# Patient Record
Sex: MALE
[De-identification: names, ages, dates, MRNs, and addresses within clinical notes are randomized per-mention and may not be internally consistent; named-entity substitution may affect disease eponyms.]

---

## 2018-06-07 ENCOUNTER — HOSPITAL ENCOUNTER (EMERGENCY)
Dept: HOSPITAL 14 - H.ER | Age: 83
Discharge: HOME | End: 2018-06-07
Payer: MEDICARE

## 2018-06-07 VITALS
DIASTOLIC BLOOD PRESSURE: 90 MMHG | TEMPERATURE: 98.2 F | HEART RATE: 65 BPM | OXYGEN SATURATION: 98 % | RESPIRATION RATE: 16 BRPM | SYSTOLIC BLOOD PRESSURE: 146 MMHG

## 2018-06-07 DIAGNOSIS — E11.9: ICD-10-CM

## 2018-06-07 DIAGNOSIS — M79.606: Primary | ICD-10-CM

## 2018-06-07 NOTE — ED PDOC
Lower Extremity Pain/Injury


Time Seen by Provider: 06/07/18 13:17


Chief Complaint (Nursing): Lower Extremity Problem/Injury


Chief Complaint (Provider): Leg pain


History Per: Patient, EMS


History/Exam Limitations: no limitations


Onset/Duration Of Symptoms: Mins


Current Symptoms Are (Timing): Better


Additional Complaint(s): 





86 year old male presented to the ED via EMS complaining of leg pain.  Patient 

reports he was walking to the doctor's office when he felt pain in both legs.  

Patient then flagged a  down who called EMS and brought him to the ED.

  He denies leg pain at this time along with swelling, chest pain, and SOB.  





PCP: Scott Ovalles





Past Medical History


Reviewed: Historical Data, Nursing Documentation, Vital Signs


Vital Signs: 





 Last Vital Signs











Temp  98.2 F   06/07/18 12:52


 


Pulse  65   06/07/18 12:52


 


Resp  16   06/07/18 12:52


 


BP  146/90   06/07/18 12:52


 


Pulse Ox  98   06/07/18 12:52














- Medical History


PMH: No Chronic Diseases, Diabetes





- Surgical History


Surgical History: No Surg Hx





- Family History


Family History: States: Unknown Family Hx





- Allergies


Allergies/Adverse Reactions: 


 Allergies











Allergy/AdvReac Type Severity Reaction Status Date / Time


 


No Known Allergies Allergy   Verified 06/07/18 12:55














Review of Systems


ROS Statement: Except As Marked, All Systems Reviewed And Found Negative


Cardiovascular: Negative for: Chest Pain


Respiratory: Negative for: Shortness of Breath


Musculoskeletal: Positive for: Leg Pain (bilateral).  Negative for: Other (leg 

swelling)





Physical Exam





- Reviewed


Nursing Documentation Reviewed: Yes


Vital Signs Reviewed: Yes





- Physical Exam


Appears: Positive for: Non-toxic, No Acute Distress


Head Exam: Positive for: ATRAUMATIC, NORMOCEPHALIC


Skin: Positive for: Normal Color, Warm, Dry


Eye Exam: Positive for: Normal appearance


Neck: Positive for: Normal, Painless ROM


Cardiovascular/Chest: Positive for: Regular Rate, Rhythm.  Negative for: Murmur


Respiratory: Positive for: Normal Breath Sounds.  Negative for: Wheezing, 

Respiratory Distress


Extremity: Positive for: Normal ROM.  Negative for: Tenderness, Other (erythema)


Neurologic/Psych: Positive for: Alert, Oriented (x3).  Negative for: Motor/

Sensory Deficits





- ECG


O2 Sat by Pulse Oximetry: 98 (RA)


Pulse Ox Interpretation: Normal





Medical Decision Making


Medical Decision Making: 





Initial Impression: Leg pain





Initial Plan: 


Dr. Plunkett was consulted and he agrees with discharge.  He states patient has 

a history of lumbar spinal issues and this is nothing new for him.  Patient 

will follow up at his office.  


--------------------------------------------------------------------------------

-----------------


Scribe Attestation:


Documented by Ahmet Sun acting as a scribe for Danisah Keen MD.





Provider Scribe Attestation:


All medical record entries made by the Scribe were at my direction and 

personally dictated by me. I have reviewed the chart and agree that the record 

accurately reflects my personal performance of the history, physical exam, 

medical decision making, and the department course for this patient. I have 

also personally directed, reviewed, and agree with the discharge instructions 

and disposition.





Disposition





- Clinical Impression


Clinical Impression: 


 Chronic pain of lower extremity, bilateral








- Disposition


Referrals: 


Scott Plunkett MD [Family Provider] - 


Disposition: Routine/Home


Disposition Time: 14:19


Condition: STABLE


Additional Instructions: 


KENNEDY VELOZ.





Instructions:  Chronic Pain (DC)


Forms:  [employer] ([place])